# Patient Record
Sex: FEMALE
[De-identification: names, ages, dates, MRNs, and addresses within clinical notes are randomized per-mention and may not be internally consistent; named-entity substitution may affect disease eponyms.]

---

## 2018-11-26 ENCOUNTER — IMAGING SERVICES (OUTPATIENT)
Dept: OTHER | Age: 53
End: 2018-11-26

## 2018-11-26 ENCOUNTER — HOSPITAL (OUTPATIENT)
Dept: OTHER | Age: 53
End: 2018-11-26
Attending: INTERNAL MEDICINE

## 2018-11-29 ENCOUNTER — HOSPITAL (OUTPATIENT)
Dept: OTHER | Age: 53
End: 2018-11-29
Attending: INTERNAL MEDICINE

## 2018-11-29 ENCOUNTER — IMAGING SERVICES (OUTPATIENT)
Dept: OTHER | Age: 53
End: 2018-11-29

## 2018-12-13 ENCOUNTER — HOSPITAL (OUTPATIENT)
Dept: OTHER | Age: 53
End: 2018-12-13
Attending: INTERNAL MEDICINE

## 2018-12-14 ENCOUNTER — CHARTING TRANS (OUTPATIENT)
Dept: OTHER | Age: 53
End: 2018-12-14

## 2019-01-25 ENCOUNTER — HOSPITAL (OUTPATIENT)
Dept: OTHER | Age: 54
End: 2019-01-25
Attending: INTERNAL MEDICINE

## 2021-08-24 ENCOUNTER — TELEPHONE (OUTPATIENT)
Dept: SCHEDULING | Age: 56
End: 2021-08-24

## 2024-08-29 ENCOUNTER — HOSPITAL ENCOUNTER (OUTPATIENT)
Age: 59
Discharge: HOME | End: 2024-08-29
Payer: COMMERCIAL

## 2024-08-29 VITALS
OXYGEN SATURATION: 96 % | HEART RATE: 88 BPM | DIASTOLIC BLOOD PRESSURE: 89 MMHG | TEMPERATURE: 99 F | RESPIRATION RATE: 18 BRPM | SYSTOLIC BLOOD PRESSURE: 165 MMHG

## 2024-08-29 DIAGNOSIS — S39.012A BACK STRAIN, INITIAL ENCOUNTER: Primary | ICD-10-CM

## 2024-08-29 DIAGNOSIS — M54.32 SCIATICA OF LEFT SIDE: ICD-10-CM

## 2024-08-29 PROCEDURE — 96372 THER/PROPH/DIAG INJ SC/IM: CPT | Performed by: NURSE PRACTITIONER

## 2024-08-29 PROCEDURE — 99203 OFFICE O/P NEW LOW 30 MIN: CPT | Performed by: NURSE PRACTITIONER

## 2024-08-29 RX ORDER — KETOROLAC TROMETHAMINE 30 MG/ML
60 INJECTION, SOLUTION INTRAMUSCULAR; INTRAVENOUS ONCE
Status: COMPLETED | OUTPATIENT
Start: 2024-08-29 | End: 2024-08-29

## 2024-08-29 RX ORDER — METHYLPREDNISOLONE 4 MG
TABLET, DOSE PACK ORAL
Qty: 1 EACH | Refills: 0 | Status: SHIPPED | OUTPATIENT
Start: 2024-08-29 | End: 2024-08-29

## 2024-08-29 RX ORDER — CYCLOBENZAPRINE HCL 10 MG
10 TABLET ORAL 3 TIMES DAILY PRN
Qty: 20 TABLET | Refills: 0 | Status: SHIPPED | OUTPATIENT
Start: 2024-08-29 | End: 2024-09-05

## 2024-08-29 RX ORDER — METHYLPREDNISOLONE 4 MG
TABLET, DOSE PACK ORAL
Qty: 1 EACH | Refills: 0 | Status: SHIPPED | OUTPATIENT
Start: 2024-08-29

## 2024-08-29 RX ORDER — CYCLOBENZAPRINE HCL 10 MG
10 TABLET ORAL 3 TIMES DAILY PRN
Qty: 20 TABLET | Refills: 0 | Status: SHIPPED | OUTPATIENT
Start: 2024-08-29 | End: 2024-08-29

## 2024-08-29 NOTE — DISCHARGE INSTRUCTIONS
Ice to the back.  Gentle stretching exercises.  Rest and do not lift until you are pain-free.  Take the medications as prescribed.  The muscle relaxant may make you drowsy, do not drive while taking.  Follow-up as needed with your doctor or physiatry.  Return for any concerns.

## 2024-08-29 NOTE — ED PROVIDER NOTES
He    Patient Seen in: [facility]      History     Chief Complaint   Patient presents with    Back Pain     Stated Complaint: L Back Pain  Subjective:   59-year-old  female with a history of hypertension presents for a back strain that occurred 2 weeks ago.  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  She states she lifted this patient and developed pain in the left side of the lower back.  The pain radiates into the left buttock and down the posterior left leg.  She has been taking Motrin with some relief, but states when she lifts at work, the pain worsens.  No history of problems with her back in the past.  No midline or bony tenderness.  At times she does get some tingling down her left leg.  No weakness.  No numbness.  No incontinence.  No urinary symptoms.  No vivien hematuria.  No abdominal or flank pain.  No fevers.  She is ambulatory without difficulty.  She appears nontoxic.      Objective:   Past Medical History:    Essential hypertension            History reviewed. No pertinent surgical history.           No pertinent social history.          Review of Systems    Positive for stated complaint: Back Pain     Other systems are as noted in HPI.  Constitutional and vital signs reviewed.      All other systems reviewed and negative except as noted above.    Physical Exam     ED Triage Vitals [08/29/24 1645]   BP (!) 165/89   Pulse 88   Resp 18   Temp 99 °F (37.2 °C)   Temp src Temporal   SpO2 96 %   O2 Device None (Room air)     Current:BP (!) 165/89   Pulse 88   Temp 99 °F (37.2 °C) (Temporal)   Resp 18   SpO2 96%     Physical Exam  Vitals and nursing note reviewed.   Constitutional:       General: She is not in acute distress.     Appearance: Normal appearance. She is not toxic-appearing.   HENT:      Head: Normocephalic.   Cardiovascular:      Rate and Rhythm: Normal rate and regular rhythm.   Pulmonary:      Effort: Pulmonary effort is normal.      Breath sounds:  Normal breath sounds.   Abdominal:      Palpations: Abdomen is soft.      Tenderness: There is no abdominal tenderness. There is no right CVA tenderness or left CVA tenderness.   Musculoskeletal:         General: Tenderness and signs of injury present.      Cervical back: Normal.      Comments: Pain to the left side the lower back that radiates into the left buttock and down the posterior left leg.  Ambulatory without difficulty.  Negative bilateral leg raise.  No skin abnormalities.  +5 strength upper and lower extremities.  No point tenderness to the lumbar, thoracic, or cervical spines.   Skin:     General: Skin is warm and dry.      Capillary Refill: Capillary refill takes less than 2 seconds.      Findings: No rash.   Neurological:      General: No focal deficit present.      Mental Status: She is oriented to person, place, and time.      Cranial Nerves: No cranial nerve deficit.      Sensory: No sensory deficit.      Motor: No weakness.      Coordination: Coordination normal.   Psychiatric:         Mood and Affect: Mood normal.         Behavior: Behavior normal.         ED Course   No results found.  Labs Reviewed - No data to display    MDM     Medical Decision Making  Toradol was given with relief.  I will prescribe the patient a Medrol Dosepak and Flexeril to take as needed.  She is aware the Flexeril may make her drowsy and that she cannot drive while taking.  We discussed ice to the back and gentle stretching exercises.  I will refer her to physiatry for follow-up.    Risk  OTC drugs.  Prescription drug management.  Risk Details: Back strain versus sciatica versus fracture        Disposition and Plan     Clinical Impression:  1. Back strain, initial encounter    2. Sciatica of left side         Disposition:  Discharge  8/29/2024  5:54 pm    Follow-up:  Wing Yusuf MD  [address] [place] [address]  [phone]    Schedule an appointment as soon as possible for a visit   As  needed          Medications Prescribed:  Current Discharge Medication List        START taking these medications    Details   methylPREDNISolone (MEDROL) 4 MG Oral Tablet Therapy Pack Dosepack: take as directed  Qty: 1 each, Refills: 0      cyclobenzaprine 10 MG Oral Tab Take 1 tablet (10 mg total) by mouth 3 (three) times daily as needed for Muscle spasms.  Qty: 20 tablet, Refills: 0

## 2024-08-29 NOTE — ED INITIAL ASSESSMENT (HPI)
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 Taking ibuprofen and tylenol, last dose ibuprofen today 0900.

## 2024-09-04 ENCOUNTER — APPOINTMENT (OUTPATIENT)
Dept: GENERAL RADIOLOGY | Age: 59
End: 2024-09-04
Attending: NURSE PRACTITIONER
Payer: COMMERCIAL

## 2024-09-04 ENCOUNTER — HOSPITAL ENCOUNTER (OUTPATIENT)
Age: 59
Discharge: HOME | End: 2024-09-04
Payer: COMMERCIAL

## 2024-09-04 VITALS
TEMPERATURE: 98 F | SYSTOLIC BLOOD PRESSURE: 155 MMHG | RESPIRATION RATE: 20 BRPM | HEART RATE: 74 BPM | DIASTOLIC BLOOD PRESSURE: 86 MMHG | OXYGEN SATURATION: 98 %

## 2024-09-04 DIAGNOSIS — S39.012A BACK STRAIN, INITIAL ENCOUNTER: Primary | ICD-10-CM

## 2024-09-04 DIAGNOSIS — M54.32 SCIATICA OF LEFT SIDE: ICD-10-CM

## 2024-09-04 PROCEDURE — 72100 X-RAY EXAM L-S SPINE 2/3 VWS: CPT | Performed by: NURSE PRACTITIONER

## 2024-09-04 PROCEDURE — 99213 OFFICE O/P EST LOW 20 MIN: CPT | Performed by: NURSE PRACTITIONER

## 2024-09-04 RX ORDER — LIDOCAINE 50 MG/G
1 PATCH TOPICAL EVERY 24 HOURS
Qty: 10 PATCH | Refills: 0 | Status: SHIPPED | OUTPATIENT
Start: 2024-09-04 | End: 2024-09-04

## 2024-09-04 RX ORDER — NAPROXEN 500 MG/1
500 TABLET ORAL 2 TIMES DAILY PRN
Qty: 20 TABLET | Refills: 0 | Status: SHIPPED | OUTPATIENT
Start: 2024-09-04 | End: 2024-09-11

## 2024-09-04 RX ORDER — LIDOCAINE 50 MG/G
1 PATCH TOPICAL EVERY 24 HOURS
Qty: 10 PATCH | Refills: 0 | Status: SHIPPED | OUTPATIENT
Start: 2024-09-04 | End: 2024-09-14

## 2024-09-04 RX ORDER — NAPROXEN 500 MG/1
500 TABLET ORAL 2 TIMES DAILY PRN
Qty: 20 TABLET | Refills: 0 | Status: SHIPPED | OUTPATIENT
Start: 2024-09-04 | End: 2024-09-04

## 2024-09-04 NOTE — DISCHARGE INSTRUCTIONS
Ice to the back.  Rest.  Take the medications as prescribed.  Take the muscle relaxant you have at home in the evening, it may make you drowsy.  Do not drive while taking the muscle relaxant.  You can put a lidocaine patch on your back every 24 hours.  Follow-up with the physiatrist referred to.  Return for any concerns.

## 2024-09-04 NOTE — ED INITIAL ASSESSMENT (HPI)
Seen here 8/29 for lower back pain. Pain continues and is traveling down L leg. Taking ibuprofen, last dose this AM.

## 2024-09-04 NOTE — ED PROVIDER NOTES
He    Patient Seen in: [facility]      History     Chief Complaint   Patient presents with    Back Pain     Stated Complaint: Back Pain  Subjective:   59-year-old female with a history of hypertension presents for left-sided lower back pain that radiates into the left buttock and down the posterior left leg.  She was seen here couple weeks ago for this, was prescribed a muscle relaxant and a steroid pack.  She states the pain did resolve with the steroid pack, but then returned last night.  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  She states she did start lifting again when the pain returned.  No skin abnormalities.  No numbness or tingling.  No weakness.  No abdominal pain.  No flank pain.  No hematuria or other urinary symptoms.  The pain is reproducible with movement.  Ambulatory without difficulty.  No fevers.  She appears nontoxic.      Objective:   Past Medical History:    Essential hypertension            History reviewed. No pertinent surgical history.           No pertinent social history.          Review of Systems    Positive for stated complaint: Back Pain     Other systems are as noted in HPI.  Constitutional and vital signs reviewed.      All other systems reviewed and negative except as noted above.    Physical Exam     ED Triage Vitals [09/04/24 1235]   /86   Pulse 74   Resp 20   Temp 97.5 °F (36.4 °C)   Temp src Temporal   SpO2 98 %   O2 Device None (Room air)     Current:/86   Pulse 74   Temp 97.5 °F (36.4 °C) (Temporal)   Resp 20   SpO2 98%     Physical Exam  Vitals and nursing note reviewed.   Constitutional:       General: She is not in acute distress.     Appearance: Normal appearance. She is not toxic-appearing.   HENT:      Head: Normocephalic.      Mouth/Throat:      Mouth: Mucous membranes are moist.   Eyes:      Pupils: Pupils are equal, round, and reactive to light.    Cardiovascular:      Rate and Rhythm: Normal rate and regular rhythm.   Pulmonary:      Effort: Pulmonary effort is normal.      Breath sounds: Normal breath sounds.   Abdominal:      Palpations: Abdomen is soft.      Tenderness: There is no abdominal tenderness. There is no right CVA tenderness or left CVA tenderness.   Musculoskeletal:         General: Tenderness present.      Cervical back: Normal.      Thoracic back: Normal.      Lumbar back: Spasms and tenderness present. No edema, signs of trauma or bony tenderness. Decreased range of motion. Negative right straight leg raise test and negative left straight leg raise test.      Right lower leg: No edema.      Left lower leg: No edema.      Comments: Left-sided lower back pain that is reproducible with movement.  The pain radiates into the left buttock and down the posterior left leg.  Ambulatory without difficulty.  No point tenderness to the lumbar, thoracic, or cervical spines.  No CVA tenderness.  No skin abnormalities.  CMS intact.  Negative bilateral leg raise.   Skin:     General: Skin is warm and dry.      Capillary Refill: Capillary refill takes less than 2 seconds.      Findings: No rash.   Neurological:      General: No focal deficit present.      Mental Status: She is alert and oriented to person, place, and time.      Sensory: No sensory deficit.      Motor: No weakness.      Coordination: Coordination normal.      Gait: Gait normal.   Psychiatric:         Mood and Affect: Mood normal.         Behavior: Behavior normal.         ED Course   XR LUMBAR SPINE (MIN 2 VIEWS) (CPT=72100)    Result Date: 9/4/2024  CONCLUSION: Mild degenerative change within the lumbar spine.  No acute osseous abnormality.    Dictated by (CST): Say Thacker MD on 9/04/2024 at 1:19 PM     Finalized by (CST): Say Thacker MD on 9/04/2024 at 1:20 PM         Labs Reviewed - No data to display    MDM     Medical Decision Making  The patient took ibuprofen within the past 4  hours, so Toradol was not able to be given today.  We discussed ice to the back, gentle stretching exercises, and avoiding lifting.  I will prescribe her naproxen to take twice daily as needed with food and lidocaine patches.  A lidocaine patch was placed prior to discharge.  She is aware she can continue to take the muscle relaxant as needed, but it will make her drowsy and she cannot drive while taking.  After her first visit she did not make a follow-up appointment with physiatry, she states she will now.  She is aware if it continues, she may need further intervention.    Amount and/or Complexity of Data Reviewed  Radiology: ordered.     Details: I visualized the x-ray images of the lumbar spine, there is no acute fracture, some degenerative changes.    Risk  OTC drugs.  Prescription drug management.  Risk Details: Sciatica versus fracture        Disposition and Plan     Clinical Impression:  1. Back strain, initial encounter    2. Sciatica of left side         Disposition:  Discharge  9/4/2024  1:29 pm    Follow-up:  Wing Yusuf MD  [address] [place] [address]  [phone]    Call   to arrange follow up          Medications Prescribed:  Discharge Medication List as of 9/4/2024  1:31 PM        START taking these medications    Details   naproxen 500 MG Oral Tab Take 1 tablet (500 mg total) by mouth 2 (two) times daily as needed., Normal, Disp-20 tablet, R-0      lidocaine 5 % External Patch Place 1 patch onto the skin daily for 10 doses., Normal, Disp-10 patch, R-0

## 2024-09-17 DIAGNOSIS — Z13.6 SCREENING FOR ISCHEMIC HEART DISEASE: Primary | ICD-10-CM

## 2024-11-04 ENCOUNTER — TELEPHONE (OUTPATIENT)
Dept: SURGERY | Facility: CLINIC | Age: 59
End: 2024-11-04

## 2024-11-04 RX ORDER — AZELASTINE 1 MG/ML
SPRAY, METERED NASAL
COMMUNITY

## 2024-11-04 RX ORDER — VALSARTAN 160 MG/1
160 TABLET ORAL DAILY
COMMUNITY
Start: 2024-08-10

## 2024-11-04 RX ORDER — PANTOPRAZOLE SODIUM 40 MG/1
1 TABLET, DELAYED RELEASE ORAL DAILY
COMMUNITY
Start: 2024-09-17

## 2024-11-04 RX ORDER — NAPROXEN 500 MG/1
TABLET ORAL
COMMUNITY

## 2024-11-04 RX ORDER — FLUCONAZOLE 100 MG/1
TABLET ORAL
COMMUNITY
Start: 2024-09-27

## 2024-11-04 RX ORDER — TIZANIDINE 2 MG/1
TABLET ORAL
COMMUNITY

## 2024-11-04 RX ORDER — AMLODIPINE BESYLATE 5 MG/1
1 TABLET ORAL DAILY
COMMUNITY
Start: 2024-10-24

## 2024-11-04 NOTE — PATIENT INSTRUCTIONS
Refill policies:    Allow 2-3 business days for refills; controlled substances may take longer.  Contact your pharmacy at least 5 days prior to running out of medication and have them send an electronic request or submit request through the “request refill” option in your [employer] account.  Refills are not addressed on weekends; covering physicians do not authorize routine medications on weekends.  No narcotics or controlled substances are refilled after noon on Fridays or by on call physicians.  By law, narcotics must be electronically prescribed.  A 30 day supply with no refills is the maximum allowed.  If your prescription is due for a refill, you may be due for a follow up appointment.  To best provide you care, patients receiving routine medications need to be seen at least once a year.  Patients receiving narcotic/controlled substance medications need to be seen at least once every 3 months.  In the event that your preferred pharmacy does not have the requested medication in stock (e.g. Backordered), it is your responsibility to find another pharmacy that has the requested medication available.  We will gladly send a new prescription to that pharmacy at your request.    Scheduling Tests:    If your physician has ordered radiology tests such as MRI or CT scans, please contact Central Scheduling at [phone] right away to schedule the test.  Once scheduled, the [facility] Centralized Referral Team will work with your insurance carrier to obtain pre-certification or prior authorization.  Depending on your insurance carrier, approval may take 3-10 days.  It is highly recommended patients assure they have received an authorization before having a test performed.  If test is done without insurance authorization, patient may be responsible for the entire amount billed.      Precertification and Prior Authorizations:  If your physician has recommended that you have a procedure or additional testing performed the [facility]  Centralized Referral Team will contact your insurance carrier to obtain pre-certification or prior authorization.    You are strongly encouraged to contact your insurance carrier to verify that your procedure/test has been approved and is a COVERED benefit.  Although the [facility] Centralized Referral Team does its due diligence, the insurance carrier gives the disclaimer that \"Although the procedure is authorized, this does not guarantee payment.\"    Ultimately the patient is responsible for payment.   Thank you for your understanding in this matter.  Paperwork Completion:  If you require FMLA or disability paperwork for your recovery, please make sure to either drop it off or have it faxed to our office at [phone]. Be sure the form has your name and date of birth on it.  The form will be faxed to our Forms Department and they will complete it for you.  There is a 25$ fee for all forms that need to be filled out.  Please be aware there is a 10-14 day turnaround time.  You will need to sign a release of information (SUMAN) form if your paperwork does not come with one.  You may call the Forms Department with any questions at [phone].  Their fax number is [phone].

## 2024-11-04 NOTE — TELEPHONE ENCOUNTER
Patient dropped off imaging disc from [facility] to the [facility] for .     09/25/2024 - MRI Lumbar Spine WO Contrast     Films uploaded to PACS.   Disc will be returned to the patient at her appointment with Dr. Macias on 11/05/2024.

## 2024-11-05 ENCOUNTER — OFFICE VISIT (OUTPATIENT)
Dept: SURGERY | Facility: CLINIC | Age: 59
End: 2024-11-05
Payer: COMMERCIAL

## 2024-11-05 VITALS
OXYGEN SATURATION: 95 % | BODY MASS INDEX: 33.66 KG/M2 | WEIGHT: 190 LBS | HEART RATE: 86 BPM | HEIGHT: 63 IN | DIASTOLIC BLOOD PRESSURE: 95 MMHG | SYSTOLIC BLOOD PRESSURE: 150 MMHG

## 2024-11-05 DIAGNOSIS — M47.26 OTHER SPONDYLOSIS WITH RADICULOPATHY, LUMBAR REGION: Primary | ICD-10-CM

## 2024-11-05 DIAGNOSIS — M43.16 SPONDYLOLISTHESIS OF LUMBAR REGION: ICD-10-CM

## 2024-11-05 PROCEDURE — 99244 OFF/OP CNSLTJ NEW/EST MOD 40: CPT | Performed by: NEUROLOGICAL SURGERY

## 2024-11-05 RX ORDER — VALSARTAN 320 MG/1
320 TABLET ORAL DAILY
COMMUNITY
Start: 2024-10-25

## 2024-11-05 NOTE — PROGRESS NOTES
New patient:  Reason for visit: New patient presents to clinic complaining of left buttock and left, posterior left pain. Patient is wearing waist belt.     Dx: Lumbar Bulging disc, arthritis in spine, and sciatic.    Referred: Dr. Shell in [place].     Estimated time of onset:  2 months ago     Numeric Rating Scale:      Pain at Present:  1/10                                                                                                                       Distribution of Pain:  Left     Past Treatments for Current Pain Condition:    Surgery: None   Physical Therapy: PT - 16 sessions - some relief   Injections: No spinal injections   Medications: Tylenol or Ibuprofen/Advil   CBD oil - itching - stopped     Prior diagnostic testing related to this condition:  MRI and XR Lumbar

## 2024-11-05 NOTE — H&P
CHERELLE SCRUGGS M.D., F.A.A.N.S.     of Neurosurgery  [facility]  Board Certified Neurosurgeon                              [facility] Neurosurgery        [facility]      [address]  [address] [place], [place] [id number]    PHONE  [phone]          FAX  [phone]    [email or web address]    [facility], [facility], [place]     OFFICE CONSULTATION          Aisha Amanda  : 3/16/1965   MRN: [id number]    PCP: No primary care provider on file.  Referring Provider: No ref. provider found     Insurance: Payor: COMMERCIAL / Plan: COMMERCIAL / Product Type: *No Product type* /            Date of Consult:  2024    Reason for Consultation:  Our patient has been referred to our office for evaluation of: History of left lower extremity pain      History of Present Illness:  Aisha Amanda is a a(n) right-handed, 59 year old, female.  This is a pleasant lady who experienced the acute onset of lower back and left lower extremity pain several months ago.  She treated conservatively with oral analgesics as well as an injection of nonsteroidal anti-inflammatories.  Subsequently, she was referred for physical therapy and is in the process of finishing 16 sessions of therapy.  She is much improved with essentially almost complete resolution of her discomfort.  She is utilizing a brace when she is lifting heavy loads as this is part of her job.  She denies any right lower extremity pain.  She denies any acute bowel or bladder changes.        History:  Past Medical History:    Essential hypertension      No past surgical history on file.  No family history on file.   Social History     Socioeconomic History    Marital status:      Spouse name: Not on file    Number of children: Not on file    Years of education: Not on file    Highest education level: Not on file   Occupational History    Not on  file   Tobacco Use    Smoking status: Never    Smokeless tobacco: Never   Substance and Sexual Activity    Alcohol use: Not on file    Drug use: Not on file    Sexual activity: Not on file   Other Topics Concern    Not on file   Social History Narrative    Not on file     Social Drivers of Health     Financial Resource Strain: Not on file   Food Insecurity: Not on file   Transportation Needs: Not on file   Physical Activity: Not on file   Stress: Not on file   Social Connections: Not on file   Housing Stability: Not on file        Allergies:  Allergies[1]    Medications:  Current Outpatient Medications   Medication Sig Dispense Refill    valsartan 320 MG Oral Tab Take 1 tablet (320 mg total) by mouth daily.      amLODIPine 5 MG Oral Tab Take 1 tablet (5 mg total) by mouth daily.      azelastine 0.1 % Nasal Solution 1 puff in each nostril Nasally Twice a day for 30 days      fluconazole 100 MG Oral Tab 1 tablet Orally daily for 7 days      naproxen 500 MG Oral Tab 1 tablet with food or milk as needed Orally every 12 hrs as needed      pantoprazole 40 MG Oral Tab EC Take 1 tablet (40 mg total) by mouth daily.      valsartan 160 MG Oral Tab Take 1 tablet (160 mg total) by mouth daily. (Patient not taking: Reported on 11/5/2024)      tiZANidine 2 MG Oral Tab 1 tablet at bedtime as needed Orally Once a day for 30 days (Patient not taking: Reported on 11/5/2024)      methylPREDNISolone (MEDROL) 4 MG Oral Tablet Therapy Pack Dosepack: take as directed (Patient not taking: Reported on 11/5/2024) 1 each 0        Review of Systems:  A 10-point system was reviewed.  Pertinent positives and negatives are noted in HPI.      Physical Exam:  BP (!) 150/95 (BP Location: Left arm, Patient Position: Sitting, Cuff Size: large)   Pulse 86   Ht 63\"   Wt 190 lb (86.2 kg)   SpO2 95%   BMI 33.66 kg/m²        Neurological Exam:    Motor Strength:  5/5 AMG and symmetric    Sensation to light touch:  Intact and symmetric in bilateral  lower extremities    DTRs:  2+/4 in patellar distribution    Long tract signs:  Negative mariee  Negative babinski  Negative clonus      Abdomen:  Soft, non-distended, non-tender, with no rebound or guarding.  No peritoneal signs.     Extremities:  Non-tender, no lower extremity edema noted.      Labs:  CBC:  No results found for: \"WBC\", \"HGB\", \"HEMOGLOBIN\", \"HCT\", \"MCV\", \"PLT\"   BMG:   No results found for: \"GLUF\", \"CALCIUM\", \"NA\", \"K\", \"CO2\", \"CL\", \"BUN\", \"CREATININE\"   INR:   No results found for: \"INR\", \"PROTIME\"     Diagnostics:  I reviewed an MRI of the lumbar spine without contrast.  There is proper alignment of the lumbar vertebral bodies with mild grade 1 spondylolisthesis at L4-5.  There is significant left lateral recess stenosis at this level with the facet joints slightly entrapping the passing left L5 nerve root in the intra-axial course.     Diagnosis:  1. Other spondylosis with radiculopathy, lumbar region    2. Spondylolisthesis of lumbar region        Assessment/Plan:  Aisha Amanda is a a(n) 59 year old, female, who presents with an improved and essentially resolved left lumbar radiculopathy associated with spondylotic disease at L4-5.  She has utilized oral analgesics and physical therapy effectively and is now improved.  I counseled her relative to lifestyle changes and we discussed the need to lose weight.  Furthermore, she should avoid utilizing the brace more than absolutely necessary during heavy activities.  She is encouraged to continue over-the-counter nonsteroidal anti-inflammatories as needed and to finish the physical therapy regimen.  I do not see the need for any further treatment at this time.  We did discuss the option of epidural steroid injections or even surgery for the future if necessary.  She will follow-up with us on a as needed basis.    All questions and concerns were addressed. We appreciate the opportunity to participate in the care of this patient. Please do not  hesitate to call our office ([phone]) with any issues.   This report will be submitted to the referring provider.          Bandar Macias M.D., F.A.A.N.S.    11/5/2024  1:32 PM    This note has been dictated utilizing voice recognition software. Unfortunately, this may lead to occasional typographical errors. If there are any questions regarding this, please do not hesitate to contact our office.        [1] No Known Allergies

## 2024-11-15 ENCOUNTER — HOSPITAL ENCOUNTER (OUTPATIENT)
Dept: CT IMAGING | Age: 59
End: 2024-11-15
Attending: INTERNAL MEDICINE

## 2024-11-15 DIAGNOSIS — Z13.6 SCREENING FOR ISCHEMIC HEART DISEASE: ICD-10-CM
